# Patient Record
Sex: FEMALE | Race: WHITE | Employment: PART TIME | URBAN - METROPOLITAN AREA
[De-identification: names, ages, dates, MRNs, and addresses within clinical notes are randomized per-mention and may not be internally consistent; named-entity substitution may affect disease eponyms.]

---

## 2023-09-06 ENCOUNTER — TELEPHONE (OUTPATIENT)
Dept: FAMILY MEDICINE CLINIC | Age: 58
End: 2023-09-06

## 2023-09-06 DIAGNOSIS — E78.2 MIXED HYPERLIPIDEMIA: ICD-10-CM

## 2023-09-06 DIAGNOSIS — E11.49 TYPE 2 DIABETES MELLITUS WITH NEUROLOGICAL MANIFESTATIONS, CONTROLLED (HCC): Primary | ICD-10-CM

## 2023-09-06 DIAGNOSIS — F33.1 MODERATE RECURRENT MAJOR DEPRESSION (HCC): ICD-10-CM

## 2023-09-06 DIAGNOSIS — E03.8 OTHER SPECIFIED HYPOTHYROIDISM: ICD-10-CM

## 2023-09-06 NOTE — TELEPHONE ENCOUNTER
Patient calling an stating that she was due for lab work in August,  patient was unable to do and is in need of new orders and she would like A1C and whatever other lab work he feels is necessary at this time for her    Patient would like the orders faxed to :  [facility]  #[phone] (fax)

## 2023-09-07 RX ORDER — ARIPIPRAZOLE 20 MG/1
TABLET ORAL
COMMUNITY
Start: 2023-08-21

## 2023-09-07 RX ORDER — BENZTROPINE MESYLATE 0.5 MG/1
TABLET ORAL
COMMUNITY
Start: 2023-08-21

## 2023-09-07 RX ORDER — LEVOTHYROXINE SODIUM 0.03 MG/1
TABLET ORAL
COMMUNITY
Start: 2023-07-31

## 2023-09-07 RX ORDER — HALOPERIDOL 5 MG/1
TABLET ORAL
COMMUNITY
Start: 2023-08-03

## 2023-09-07 RX ORDER — LURASIDONE HYDROCHLORIDE 40 MG/1
TABLET, FILM COATED ORAL
COMMUNITY
Start: 2023-08-04

## 2023-09-07 RX ORDER — VENLAFAXINE 75 MG/1
TABLET ORAL
COMMUNITY
Start: 2023-08-03

## 2023-09-07 NOTE — TELEPHONE ENCOUNTER
Faxed lab work to [facility] and called to inform patient that it was done

## 2023-09-17 SDOH — ECONOMIC STABILITY: HOUSING INSECURITY
IN THE LAST 12 MONTHS, WAS THERE A TIME WHEN YOU DID NOT HAVE A STEADY PLACE TO SLEEP OR SLEPT IN A SHELTER (INCLUDING NOW)?: NO

## 2023-09-17 SDOH — ECONOMIC STABILITY: FOOD INSECURITY: WITHIN THE PAST 12 MONTHS, THE FOOD YOU BOUGHT JUST DIDN'T LAST AND YOU DIDN'T HAVE MONEY TO GET MORE.: NEVER TRUE

## 2023-09-17 SDOH — ECONOMIC STABILITY: FOOD INSECURITY: WITHIN THE PAST 12 MONTHS, YOU WORRIED THAT YOUR FOOD WOULD RUN OUT BEFORE YOU GOT MONEY TO BUY MORE.: NEVER TRUE

## 2023-09-17 SDOH — ECONOMIC STABILITY: INCOME INSECURITY: HOW HARD IS IT FOR YOU TO PAY FOR THE VERY BASICS LIKE FOOD, HOUSING, MEDICAL CARE, AND HEATING?: NOT VERY HARD

## 2023-09-17 SDOH — ECONOMIC STABILITY: TRANSPORTATION INSECURITY
IN THE PAST 12 MONTHS, HAS LACK OF TRANSPORTATION KEPT YOU FROM MEETINGS, WORK, OR FROM GETTING THINGS NEEDED FOR DAILY LIVING?: NO

## 2023-09-17 ASSESSMENT — PATIENT HEALTH QUESTIONNAIRE - PHQ9
2. FEELING DOWN, DEPRESSED OR HOPELESS: 1
SUM OF ALL RESPONSES TO PHQ QUESTIONS 1-9: 1
SUM OF ALL RESPONSES TO PHQ9 QUESTIONS 1 & 2: 1
2. FEELING DOWN, DEPRESSED OR HOPELESS: SEVERAL DAYS
1. LITTLE INTEREST OR PLEASURE IN DOING THINGS: NOT AT ALL
SUM OF ALL RESPONSES TO PHQ QUESTIONS 1-9: 1
SUM OF ALL RESPONSES TO PHQ QUESTIONS 1-9: 1
SUM OF ALL RESPONSES TO PHQ9 QUESTIONS 1 & 2: 1
1. LITTLE INTEREST OR PLEASURE IN DOING THINGS: 0
SUM OF ALL RESPONSES TO PHQ QUESTIONS 1-9: 1

## 2023-09-18 ENCOUNTER — OFFICE VISIT (OUTPATIENT)
Dept: FAMILY MEDICINE CLINIC | Age: 58
End: 2023-09-18
Payer: MEDICARE

## 2023-09-18 VITALS
OXYGEN SATURATION: 99 % | BODY MASS INDEX: 30.47 KG/M2 | DIASTOLIC BLOOD PRESSURE: 86 MMHG | SYSTOLIC BLOOD PRESSURE: 132 MMHG | WEIGHT: 189.6 LBS | HEART RATE: 74 BPM | TEMPERATURE: 98.2 F | HEIGHT: 66 IN

## 2023-09-18 DIAGNOSIS — R25.9 ABNORMAL MOVEMENTS: ICD-10-CM

## 2023-09-18 DIAGNOSIS — F33.42 MAJOR DEPRESSIVE DISORDER, RECURRENT, IN FULL REMISSION WITH MOOD-CONGRUENT PSYCHOTIC FEATURES (HCC): ICD-10-CM

## 2023-09-18 DIAGNOSIS — I10 PRIMARY HYPERTENSION: ICD-10-CM

## 2023-09-18 DIAGNOSIS — E11.69 TYPE 2 DIABETES MELLITUS WITH HYPERLIPIDEMIA (HCC): Primary | ICD-10-CM

## 2023-09-18 DIAGNOSIS — E07.9 DISORDER OF THYROID: ICD-10-CM

## 2023-09-18 DIAGNOSIS — E78.5 TYPE 2 DIABETES MELLITUS WITH HYPERLIPIDEMIA (HCC): Primary | ICD-10-CM

## 2023-09-18 PROCEDURE — 3075F SYST BP GE 130 - 139MM HG: CPT | Performed by: FAMILY MEDICINE

## 2023-09-18 PROCEDURE — 3079F DIAST BP 80-89 MM HG: CPT | Performed by: FAMILY MEDICINE

## 2023-09-18 PROCEDURE — 99214 OFFICE O/P EST MOD 30 MIN: CPT | Performed by: FAMILY MEDICINE

## 2023-09-18 RX ORDER — LIOTHYRONINE SODIUM 5 UG/1
TABLET ORAL
COMMUNITY
Start: 2022-09-14 | End: 2023-09-18 | Stop reason: SDUPTHER

## 2023-09-18 RX ORDER — LEVOTHYROXINE SODIUM 0.03 MG/1
25 TABLET ORAL DAILY
Qty: 90 TABLET | Refills: 1 | Status: SHIPPED | OUTPATIENT
Start: 2023-09-18

## 2023-09-18 RX ORDER — METOPROLOL SUCCINATE 25 MG/1
25 TABLET, EXTENDED RELEASE ORAL
COMMUNITY
Start: 2022-09-15 | End: 2023-09-18 | Stop reason: SDUPTHER

## 2023-09-18 RX ORDER — METOPROLOL SUCCINATE 25 MG/1
25 TABLET, EXTENDED RELEASE ORAL DAILY
Qty: 90 TABLET | Refills: 1 | Status: SHIPPED | OUTPATIENT
Start: 2023-09-18

## 2023-09-18 RX ORDER — LIOTHYRONINE SODIUM 5 UG/1
5 TABLET ORAL DAILY
Qty: 90 TABLET | Refills: 1 | Status: SHIPPED | OUTPATIENT
Start: 2023-09-18

## 2023-09-18 RX ORDER — ARIPIPRAZOLE 20 MG/1
20 TABLET ORAL DAILY
Qty: 90 TABLET | Refills: 1 | Status: SHIPPED | OUTPATIENT
Start: 2023-09-18

## 2023-09-18 NOTE — PROGRESS NOTES
Subjective  Bonnie Devi 1965 is a 60 y.o. female who presents today with:  Chief Complaint   Patient presents with    Thyroid Problem     Hx hypothyroidism. She is currently taking Cytomel 5 mcg and Synthroid 25 mcg daily. She has recent labs to review from 9/7/23. Hypertension     She does not monitor her BP at home. Denies chest pains, palpitations, SOB, dizziness, lightheadedness, headaches or edema. Depression     She follows with Ruthann Lopez NP for [facility] concerns. She is scheduled for her next appointment on Monday. Health Maintenance     She has recent labs to review from 9/7/23 to review. Mammogram and DEXA completed at [facility] on 9/6/23. Colonoscopy completed last year at [facility]. AWV completed in August 2023 at [facility]. Other     Reports she has noticed that she has been getting a quivering in her bottom lip abut 2-3 times daily. States she can feel this at times, and not at others. I have reviewed HPI and agree with above. Lip quivering only for about 3-4 weeks. Concerned about TD. Labs done. No results. DEXA and Mammogram done as well    Review of Systems   All other systems reviewed and are negative.       Past Medical History:   Diagnosis Date    Depression     Hypertension     Hypothyroidism      Past Surgical History:   Procedure Laterality Date    HYSTERECTOMY, VAGINAL  2005     Social History     Socioeconomic History    Marital status:      Spouse name: Not on file    Number of children: Not on file    Years of education: Not on file    Highest education level: Not on file   Occupational History    Not on file   Tobacco Use    Smoking status: Never    Smokeless tobacco: Never   Vaping Use    Vaping Use: Never used   Substance and Sexual Activity    Alcohol use: Not Currently     Comment: Not very often    Drug use: Not Currently     Comment: In late teens and twenties    Sexual activity: Not Currently     Comment: Not active - no partner at this time   Other

## 2023-10-18 ENCOUNTER — OFFICE VISIT (OUTPATIENT)
Dept: FAMILY MEDICINE CLINIC | Age: 58
End: 2023-10-18
Payer: MEDICARE

## 2023-10-18 VITALS
TEMPERATURE: 97.6 F | DIASTOLIC BLOOD PRESSURE: 68 MMHG | BODY MASS INDEX: 30.67 KG/M2 | OXYGEN SATURATION: 95 % | WEIGHT: 190.8 LBS | HEART RATE: 68 BPM | SYSTOLIC BLOOD PRESSURE: 116 MMHG | HEIGHT: 66 IN

## 2023-10-18 DIAGNOSIS — M99.04 SOMATIC DYSFUNCTION OF BOTH SACROILIAC JOINTS: ICD-10-CM

## 2023-10-18 DIAGNOSIS — Z23 NEED FOR INFLUENZA VACCINATION: ICD-10-CM

## 2023-10-18 DIAGNOSIS — M25.551 RIGHT HIP PAIN: Primary | ICD-10-CM

## 2023-10-18 PROCEDURE — 99213 OFFICE O/P EST LOW 20 MIN: CPT | Performed by: FAMILY MEDICINE

## 2023-10-18 PROCEDURE — 3074F SYST BP LT 130 MM HG: CPT | Performed by: FAMILY MEDICINE

## 2023-10-18 PROCEDURE — G0008 ADMIN INFLUENZA VIRUS VAC: HCPCS | Performed by: FAMILY MEDICINE

## 2023-10-18 PROCEDURE — 90674 CCIIV4 VAC NO PRSV 0.5 ML IM: CPT | Performed by: FAMILY MEDICINE

## 2023-10-18 PROCEDURE — 3078F DIAST BP <80 MM HG: CPT | Performed by: FAMILY MEDICINE

## 2023-10-18 NOTE — PROGRESS NOTES
Kellie DC. Inappropriate Request    Refill Authorization Note   Sherin Ortiz  is requesting a refill authorization.  Brief Assessment and Rationale for Refill:  Quick Discontinue  Medication Therapy Plan:    Pharmacy is requesting new scripts for the following medications without required information, (sig/ frequency/qty/etc)       Medication Reconciliation Completed:  No      Comments: Pharmacies have been requesting medications for patients without required information, (sig, frequency, qty, etc.). In addition, requests are sent for medication(s) pt. are currently not taking, and medications patients have never taken.    We have spoken to the pharmacies about these request types and advised their teams previously that we are unable to assess these New Script requests and require all details for these requests. This is a known issue and has been reported.        Note composed:8:30 PM 04/26/2022                
Vaccine Information Sheet, \"Influenza - Inactivated\"  given to Deandre Hsu, or parent/legal guardian of  Deandre Hsu and verbalized understanding. Patient responses:    Have you ever had a reaction to a flu vaccine? No  Are you able to eat eggs without adverse effects? Yes  Do you have any current illness? No  Have you ever had Guillian Earp Syndrome? No    Flu vaccine given per order. Please see immunization tab.
10/18/23 86.5 kg (190 lb 12.8 oz)   09/18/23 86 kg (189 lb 9.6 oz)       No results found for: \"LABA1C\"  No results found for: \"CREATININE\"  No results found for: \"ALT\", \"AST\"  No results found for: \"CHOL\", \"TRIG\", \"HDL\", \"LDLCALC\", \"LDLDIRECT\"       Physical Exam  Vitals reviewed. HENT:      Head: Normocephalic. Cardiovascular:      Comments: There is bilateral first rib elevation. Corrected with FPR. Musculoskeletal:      Cervical back: Rigidity and tenderness (Due to a flexed dysfunction at several segments of the right side of the cervical spine. Corrected with very gentle FPR and muscle energy. ) present. Thoracic back: Spasms and tenderness present. Lacerations: Due to a mild scoliosis and kyphosis. Multiple segments of flexed dysfunction that are chronic. Gentle FPR used for treatment. .Decreased range of motion. Lumbar back: Tenderness (At bilateral sacroiliac joints. Left SI joint is extended right SI joint is flexed. Corrected with FPR muscle energy. ) present. Decreased range of motion. Neurological:      Mental Status: She is alert. Assessment & Plan   1. Right hip pain  2. Somatic dysfunction of both sacroiliac joints  3. Need for influenza vaccination  -     Influenza, FLUCELVAX, (age 10 mo+), IM, Preservative Free, 0.5 mL     Orders Placed This Encounter   Procedures    Influenza, FLUCELVAX, (age 10 mo+), IM, Preservative Free, 0.5 mL     No orders of the defined types were placed in this encounter. There are no discontinued medications. No follow-ups on file. Reviewed with the patient: current clinical status, medications, activities and diet. Side effects, adverse effects of the medication prescribed today, as well as treatment plan/ rationale and result expectations have been discussed with the patient who expresses understanding and desires to proceed. Close follow up to evaluate treatment results and for coordination of care.   I have reviewed the

## 2023-11-16 RX ORDER — LIOTHYRONINE SODIUM 5 UG/1
2.5 TABLET ORAL DAILY
Qty: 45 TABLET | Refills: 3 | Status: SHIPPED | OUTPATIENT
Start: 2023-11-16

## 2023-11-16 RX ORDER — ARIPIPRAZOLE 20 MG/1
20 TABLET ORAL 2 TIMES DAILY
Qty: 180 TABLET | Refills: 3 | Status: SHIPPED | OUTPATIENT
Start: 2023-11-16

## 2023-11-16 NOTE — TELEPHONE ENCOUNTER
Patient calling in says 2 of her Rxs her prescribed wrong.  Needs corrected    Abilifify 20mg- 1 tab 1x daily IS SUPPOSED to be 2x DAILY    Liothyronine 5mcg- take 1 tab a day IS SUPPOSED TO BE TAKE HALF A TAB A DAILY    Patients preferred Pharmacy for ongoing RXs- also needs to 90x days supply-[facility]-[phone]    Please call Chriss Hernandez if any questions- [phone]

## 2023-11-22 ENCOUNTER — OFFICE VISIT (OUTPATIENT)
Dept: FAMILY MEDICINE CLINIC | Age: 58
End: 2023-11-22
Payer: MEDICARE

## 2023-11-22 VITALS
OXYGEN SATURATION: 97 % | HEIGHT: 66 IN | SYSTOLIC BLOOD PRESSURE: 112 MMHG | HEART RATE: 75 BPM | DIASTOLIC BLOOD PRESSURE: 70 MMHG | WEIGHT: 190.6 LBS | TEMPERATURE: 97.9 F | BODY MASS INDEX: 30.63 KG/M2

## 2023-11-22 DIAGNOSIS — G56.02 CARPAL TUNNEL SYNDROME OF LEFT WRIST: ICD-10-CM

## 2023-11-22 DIAGNOSIS — M75.42 CORACOID IMPINGEMENT OF LEFT SHOULDER: ICD-10-CM

## 2023-11-22 DIAGNOSIS — M75.42 ROTATOR CUFF IMPINGEMENT SYNDROME OF LEFT SHOULDER: Primary | ICD-10-CM

## 2023-11-22 PROCEDURE — 3078F DIAST BP <80 MM HG: CPT | Performed by: FAMILY MEDICINE

## 2023-11-22 PROCEDURE — 3074F SYST BP LT 130 MM HG: CPT | Performed by: FAMILY MEDICINE

## 2023-11-22 PROCEDURE — 99213 OFFICE O/P EST LOW 20 MIN: CPT | Performed by: FAMILY MEDICINE

## 2023-11-22 RX ORDER — METHYLPREDNISOLONE 4 MG/1
TABLET ORAL
Qty: 1 KIT | Refills: 0 | Status: SHIPPED | OUTPATIENT
Start: 2023-11-22

## 2023-11-22 NOTE — PROGRESS NOTES
Subjective  Ana Paula Gabriel 1965 is a 60 y.o. female who presents today with:  Chief Complaint   Patient presents with    Shoulder Pain     C/O left shoulder pain x 4 weeks. Denies injury. She was out raking leaves about 2 weeks ago and the pain has been almost constant ever since. She does get some tingling and numbness in her arm/hand. Hx carpal tunnel. She cannot raise her arm above her head. Denies headaches, dizziness, or neck pain. She has not been taking anything for relief. I have reviewed HPI and agree with above. Review of Systems   All other systems reviewed and are negative.       Past Medical History:   Diagnosis Date    Depression     Hypertension     Hypothyroidism      Past Surgical History:   Procedure Laterality Date    HYSTERECTOMY, VAGINAL  2005     Social History     Socioeconomic History    Marital status:      Spouse name: Not on file    Number of children: Not on file    Years of education: Not on file    Highest education level: Not on file   Occupational History    Not on file   Tobacco Use    Smoking status: Never    Smokeless tobacco: Never   Vaping Use    Vaping Use: Never used   Substance and Sexual Activity    Alcohol use: Not Currently     Comment: Not very often    Drug use: Not Currently     Comment: In late teens and twenties    Sexual activity: Not Currently     Comment: Not active - no partner at this time   Other Topics Concern    Not on file   Social History Narrative    Not on file     Social Determinants of Health     Financial Resource Strain: Low Risk  (9/17/2023)    Overall Financial Resource Strain (CARDIA)     Difficulty of Paying Living Expenses: Not very hard   Food Insecurity: No Food Insecurity (9/17/2023)    Hunger Vital Sign     Worried About Running Out of Food in the Last Year: Never true     [address] in the Last Year: Never true   Transportation Needs: Unknown (9/17/2023)    PRAPARE - Transportation     Lack of Transportation

## 2023-12-11 ENCOUNTER — TELEPHONE (OUTPATIENT)
Dept: FAMILY MEDICINE CLINIC | Age: 58
End: 2023-12-11

## 2023-12-11 NOTE — TELEPHONE ENCOUNTER
Patient is inquiring if her pcp has any availability this Wednesday. She is still having left shoulder pain that was discussed at her 11/22 appt and would like to f/u.

## 2023-12-13 ENCOUNTER — OFFICE VISIT (OUTPATIENT)
Dept: FAMILY MEDICINE CLINIC | Age: 58
End: 2023-12-13
Payer: MEDICARE

## 2023-12-13 VITALS
BODY MASS INDEX: 30.53 KG/M2 | SYSTOLIC BLOOD PRESSURE: 130 MMHG | DIASTOLIC BLOOD PRESSURE: 78 MMHG | HEIGHT: 66 IN | OXYGEN SATURATION: 97 % | WEIGHT: 190 LBS | HEART RATE: 85 BPM | TEMPERATURE: 97.9 F

## 2023-12-13 DIAGNOSIS — M75.42 ROTATOR CUFF IMPINGEMENT SYNDROME OF LEFT SHOULDER: Primary | ICD-10-CM

## 2023-12-13 PROCEDURE — 3075F SYST BP GE 130 - 139MM HG: CPT | Performed by: FAMILY MEDICINE

## 2023-12-13 PROCEDURE — 3078F DIAST BP <80 MM HG: CPT | Performed by: FAMILY MEDICINE

## 2023-12-13 PROCEDURE — 99213 OFFICE O/P EST LOW 20 MIN: CPT | Performed by: FAMILY MEDICINE

## 2023-12-13 NOTE — PROGRESS NOTES
Lucinda Perez 1965 is a 60 y.o. female who presents today with:  Chief Complaint   Patient presents with    Shoulder Pain     C/O worsening left shoulder pain. She was given a medrol dose ricardo at her last visit, but reports the pain is worse today than it was a few weeks ago. She does get some tingling and numbness in her arm/hand. She cannot raise her arm above her head. Denies headaches, dizziness, or neck pain. She has not been taking anything for relief. Shoulder Pain       I have reviewed HPI and agree with above. Anything above shoulder causes pain. No numbness or tingling. Weakness due to pain. Was doing the home therapy. Not sure it changed anything. Rest and ice    Review of Systems   All other systems reviewed and are negative.       Past Medical History:   Diagnosis Date    Depression     Hypertension     Hypothyroidism      Past Surgical History:   Procedure Laterality Date    HYSTERECTOMY, VAGINAL  2005     Social History     Socioeconomic History    Marital status:      Spouse name: Not on file    Number of children: Not on file    Years of education: Not on file    Highest education level: Not on file   Occupational History    Not on file   Tobacco Use    Smoking status: Never    Smokeless tobacco: Never   Vaping Use    Vaping Use: Never used   Substance and Sexual Activity    Alcohol use: Not Currently     Comment: Not very often    Drug use: Not Currently     Comment: In late teens and twenties    Sexual activity: Not Currently     Comment: Not active - no partner at this time   Other Topics Concern    Not on file   Social History Narrative    Not on file     Social Determinants of Health     Financial Resource Strain: Low Risk  (9/17/2023)    Overall Financial Resource Strain (CARDIA)     Difficulty of Paying Living Expenses: Not very hard   Food Insecurity: Not on file (9/17/2023)   Transportation Needs: Unknown (9/17/2023)    [facility] - Transportation     Lack

## 2024-01-03 ENCOUNTER — OFFICE VISIT (OUTPATIENT)
Dept: FAMILY MEDICINE CLINIC | Age: 59
End: 2024-01-03
Payer: MEDICARE

## 2024-01-03 VITALS
BODY MASS INDEX: 30.53 KG/M2 | OXYGEN SATURATION: 97 % | SYSTOLIC BLOOD PRESSURE: 118 MMHG | TEMPERATURE: 97.9 F | HEART RATE: 71 BPM | DIASTOLIC BLOOD PRESSURE: 70 MMHG | HEIGHT: 66 IN | WEIGHT: 190 LBS

## 2024-01-03 DIAGNOSIS — M75.42 ROTATOR CUFF IMPINGEMENT SYNDROME OF LEFT SHOULDER: Primary | ICD-10-CM

## 2024-01-03 PROCEDURE — 3074F SYST BP LT 130 MM HG: CPT | Performed by: FAMILY MEDICINE

## 2024-01-03 PROCEDURE — 20610 DRAIN/INJ JOINT/BURSA W/O US: CPT | Performed by: FAMILY MEDICINE

## 2024-01-03 PROCEDURE — 3078F DIAST BP <80 MM HG: CPT | Performed by: FAMILY MEDICINE

## 2024-01-03 PROCEDURE — 99212 OFFICE O/P EST SF 10 MIN: CPT | Performed by: FAMILY MEDICINE

## 2024-01-03 RX ORDER — TRIAMCINOLONE ACETONIDE 40 MG/ML
40 INJECTION, SUSPENSION INTRA-ARTICULAR; INTRAMUSCULAR ONCE
Status: COMPLETED | OUTPATIENT
Start: 2024-01-03 | End: 2024-01-03

## 2024-01-03 RX ADMIN — TRIAMCINOLONE ACETONIDE 40 MG: 40 INJECTION, SUSPENSION INTRA-ARTICULAR; INTRAMUSCULAR at 12:10

## 2024-01-03 NOTE — PROGRESS NOTES
Lucinda Lozoya 1965 is a 58 y.o. female who presents today with:  No chief complaint on file.      HPI  I have reviewed HPI and agree with above. Left shoulder impingement.  Seems to be improving.  Would like injection before vacation.    Review of Systems   All other systems reviewed and are negative.      Past Medical History:   Diagnosis Date    Depression     Hypertension     Hypothyroidism      Past Surgical History:   Procedure Laterality Date    HYSTERECTOMY, VAGINAL  2005     Social History     Socioeconomic History    Marital status:      Spouse name: Not on file    Number of children: Not on file    Years of education: Not on file    Highest education level: Not on file   Occupational History    Not on file   Tobacco Use    Smoking status: Never    Smokeless tobacco: Never   Vaping Use    Vaping Use: Never used   Substance and Sexual Activity    Alcohol use: Not Currently     Comment: Not very often    Drug use: Not Currently     Comment: In late teens and twenties    Sexual activity: Not Currently     Comment: Not active - no partner at this time   Other Topics Concern    Not on file   Social History Narrative    Not on file     Social Determinants of Health     Financial Resource Strain: Low Risk  (9/17/2023)    Overall Financial Resource Strain (CARDIA)     Difficulty of Paying Living Expenses: Not very hard   Food Insecurity: Not on file (9/17/2023)   Transportation Needs: Unknown (9/17/2023)    PRAPARE - Transportation     Lack of Transportation (Medical): Not on file     Lack of Transportation (Non-Medical): No   Physical Activity: Not on file   Stress: Not on file   Social Connections: Not on file   Intimate Partner Violence: Not on file   Housing Stability: Unknown (9/17/2023)    Housing Stability Vital Sign     Unable to Pay for Housing in the Last Year: Not on file     Number of Places Lived in the Last Year: Not on file     Unstable Housing in the Last Year: No

## 2024-01-24 RX ORDER — POLYETHYLENE GLYCOL 3350 17 G/17G
17 POWDER, FOR SOLUTION ORAL DAILY
Qty: 1530 G | Refills: 3 | Status: SHIPPED | OUTPATIENT
Start: 2024-01-24 | End: 2025-01-18

## 2024-01-24 NOTE — TELEPHONE ENCOUNTER
Patient is requesting a refill of Gavilax powder. The directions are to mix and drink 17grams daily.    [facility] in [place] is the preferred pharmacy.

## 2024-02-27 RX ORDER — METOPROLOL SUCCINATE 25 MG
25 TABLET, EXTENDED RELEASE 24 HR ORAL DAILY
Qty: 90 TABLET | Refills: 1 | Status: SHIPPED | OUTPATIENT
Start: 2024-02-27

## 2024-03-11 LAB
ESTIMATED AVERAGE GLUCOSE: NORMAL
GFR, ESTIMATED: 73
HBA1C MFR BLD: 6.2 %

## 2024-03-14 ASSESSMENT — PATIENT HEALTH QUESTIONNAIRE - PHQ9
9. THOUGHTS THAT YOU WOULD BE BETTER OFF DEAD, OR OF HURTING YOURSELF: 0
5. POOR APPETITE OR OVEREATING: 1
6. FEELING BAD ABOUT YOURSELF - OR THAT YOU ARE A FAILURE OR HAVE LET YOURSELF OR YOUR FAMILY DOWN: SEVERAL DAYS
10. IF YOU CHECKED OFF ANY PROBLEMS, HOW DIFFICULT HAVE THESE PROBLEMS MADE IT FOR YOU TO DO YOUR WORK, TAKE CARE OF THINGS AT HOME, OR GET ALONG WITH OTHER PEOPLE: 1
1. LITTLE INTEREST OR PLEASURE IN DOING THINGS: SEVERAL DAYS
SUM OF ALL RESPONSES TO PHQ QUESTIONS 1-9: 4
4. FEELING TIRED OR HAVING LITTLE ENERGY: NOT AT ALL
2. FEELING DOWN, DEPRESSED OR HOPELESS: SEVERAL DAYS
SUM OF ALL RESPONSES TO PHQ QUESTIONS 1-9: 4
2. FEELING DOWN, DEPRESSED OR HOPELESS: 1
SUM OF ALL RESPONSES TO PHQ QUESTIONS 1-9: 4
4. FEELING TIRED OR HAVING LITTLE ENERGY: 0
10. IF YOU CHECKED OFF ANY PROBLEMS, HOW DIFFICULT HAVE THESE PROBLEMS MADE IT FOR YOU TO DO YOUR WORK, TAKE CARE OF THINGS AT HOME, OR GET ALONG WITH OTHER PEOPLE: SOMEWHAT DIFFICULT
9. THOUGHTS THAT YOU WOULD BE BETTER OFF DEAD, OR OF HURTING YOURSELF: NOT AT ALL
3. TROUBLE FALLING OR STAYING ASLEEP: NOT AT ALL
7. TROUBLE CONCENTRATING ON THINGS, SUCH AS READING THE NEWSPAPER OR WATCHING TELEVISION: NOT AT ALL
3. TROUBLE FALLING OR STAYING ASLEEP: 0
SUM OF ALL RESPONSES TO PHQ QUESTIONS 1-9: 4
SUM OF ALL RESPONSES TO PHQ9 QUESTIONS 1 & 2: 2
8. MOVING OR SPEAKING SO SLOWLY THAT OTHER PEOPLE COULD HAVE NOTICED. OR THE OPPOSITE - BEING SO FIDGETY OR RESTLESS THAT YOU HAVE BEEN MOVING AROUND A LOT MORE THAN USUAL: NOT AT ALL
8. MOVING OR SPEAKING SO SLOWLY THAT OTHER PEOPLE COULD HAVE NOTICED. OR THE OPPOSITE, BEING SO FIGETY OR RESTLESS THAT YOU HAVE BEEN MOVING AROUND A LOT MORE THAN USUAL: 0
1. LITTLE INTEREST OR PLEASURE IN DOING THINGS: 1
5. POOR APPETITE OR OVEREATING: SEVERAL DAYS
7. TROUBLE CONCENTRATING ON THINGS, SUCH AS READING THE NEWSPAPER OR WATCHING TELEVISION: 0
SUM OF ALL RESPONSES TO PHQ QUESTIONS 1-9: 4
6. FEELING BAD ABOUT YOURSELF - OR THAT YOU ARE A FAILURE OR HAVE LET YOURSELF OR YOUR FAMILY DOWN: 1

## 2024-03-15 ENCOUNTER — OFFICE VISIT (OUTPATIENT)
Dept: FAMILY MEDICINE CLINIC | Age: 59
End: 2024-03-15
Payer: MEDICARE

## 2024-03-15 VITALS
HEIGHT: 66 IN | DIASTOLIC BLOOD PRESSURE: 74 MMHG | TEMPERATURE: 97.4 F | WEIGHT: 190 LBS | OXYGEN SATURATION: 97 % | BODY MASS INDEX: 30.53 KG/M2 | HEART RATE: 67 BPM | SYSTOLIC BLOOD PRESSURE: 130 MMHG

## 2024-03-15 VITALS
OXYGEN SATURATION: 97 % | WEIGHT: 190 LBS | HEIGHT: 66 IN | TEMPERATURE: 97.4 F | HEART RATE: 67 BPM | DIASTOLIC BLOOD PRESSURE: 74 MMHG | SYSTOLIC BLOOD PRESSURE: 130 MMHG | BODY MASS INDEX: 30.53 KG/M2

## 2024-03-15 DIAGNOSIS — Z00.00 INITIAL MEDICARE ANNUAL WELLNESS VISIT: Primary | ICD-10-CM

## 2024-03-15 DIAGNOSIS — F33.42 MAJOR DEPRESSIVE DISORDER, RECURRENT, IN FULL REMISSION WITH MOOD-CONGRUENT PSYCHOTIC FEATURES (HCC): ICD-10-CM

## 2024-03-15 DIAGNOSIS — E78.5 TYPE 2 DIABETES MELLITUS WITH HYPERLIPIDEMIA (HCC): Primary | ICD-10-CM

## 2024-03-15 DIAGNOSIS — E11.69 TYPE 2 DIABETES MELLITUS WITH HYPERLIPIDEMIA (HCC): Primary | ICD-10-CM

## 2024-03-15 DIAGNOSIS — E03.8 OTHER SPECIFIED HYPOTHYROIDISM: ICD-10-CM

## 2024-03-15 PROCEDURE — 3078F DIAST BP <80 MM HG: CPT | Performed by: FAMILY MEDICINE

## 2024-03-15 PROCEDURE — 3075F SYST BP GE 130 - 139MM HG: CPT | Performed by: FAMILY MEDICINE

## 2024-03-15 PROCEDURE — G0438 PPPS, INITIAL VISIT: HCPCS | Performed by: FAMILY MEDICINE

## 2024-03-15 PROCEDURE — 99214 OFFICE O/P EST MOD 30 MIN: CPT | Performed by: FAMILY MEDICINE

## 2024-03-15 RX ORDER — GLUCOSAMINE HCL/CHONDROITIN SU 500-400 MG
CAPSULE ORAL
Qty: 30 STRIP | Refills: 3 | Status: SHIPPED | OUTPATIENT
Start: 2024-03-15

## 2024-03-15 ASSESSMENT — PATIENT HEALTH QUESTIONNAIRE - PHQ9
SUM OF ALL RESPONSES TO PHQ QUESTIONS 1-9: 4
SUM OF ALL RESPONSES TO PHQ QUESTIONS 1-9: 4
2. FEELING DOWN, DEPRESSED OR HOPELESS: SEVERAL DAYS
4. FEELING TIRED OR HAVING LITTLE ENERGY: NOT AT ALL
1. LITTLE INTEREST OR PLEASURE IN DOING THINGS: SEVERAL DAYS
7. TROUBLE CONCENTRATING ON THINGS, SUCH AS READING THE NEWSPAPER OR WATCHING TELEVISION: NOT AT ALL
SUM OF ALL RESPONSES TO PHQ QUESTIONS 1-9: 4
5. POOR APPETITE OR OVEREATING: SEVERAL DAYS
SUM OF ALL RESPONSES TO PHQ9 QUESTIONS 1 & 2: 2
SUM OF ALL RESPONSES TO PHQ QUESTIONS 1-9: 4
6. FEELING BAD ABOUT YOURSELF - OR THAT YOU ARE A FAILURE OR HAVE LET YOURSELF OR YOUR FAMILY DOWN: SEVERAL DAYS
10. IF YOU CHECKED OFF ANY PROBLEMS, HOW DIFFICULT HAVE THESE PROBLEMS MADE IT FOR YOU TO DO YOUR WORK, TAKE CARE OF THINGS AT HOME, OR GET ALONG WITH OTHER PEOPLE: SOMEWHAT DIFFICULT
3. TROUBLE FALLING OR STAYING ASLEEP: NOT AT ALL
8. MOVING OR SPEAKING SO SLOWLY THAT OTHER PEOPLE COULD HAVE NOTICED. OR THE OPPOSITE, BEING SO FIGETY OR RESTLESS THAT YOU HAVE BEEN MOVING AROUND A LOT MORE THAN USUAL: NOT AT ALL

## 2024-03-15 ASSESSMENT — LIFESTYLE VARIABLES
HOW OFTEN DO YOU HAVE A DRINK CONTAINING ALCOHOL: MONTHLY OR LESS
HOW MANY STANDARD DRINKS CONTAINING ALCOHOL DO YOU HAVE ON A TYPICAL DAY: 1 OR 2

## 2024-03-15 NOTE — PROGRESS NOTES
Subjective  Marsha Lozoya 1965 is a 58 y.o. female who presents today with:  Chief Complaint   Patient presents with    Hypertension     She does not monitor her BP at home. Denies chest pains, palpitations, SOB, dizziness, lightheadedness, headaches or edema.     Thyroid Problem     Hx hypothyroidism. She is currently taking Cytomel 5 mcg and Synthroid 25 mcg daily.     Depression     Moods stable with current medication. She follows with Steffany Pelayo NP for  concerns.    Health Maintenance     She has recent labs to review from 3/11/24 to review.   Mammogram and DEXA completed at [facility] on 9/6/23.   Colonoscopy completed last in April 2022 at [facility].  AWV completed last on 06/19/23.    Diabetes     A1C was 6.2 on 03/11/24.  Due for urine microalbumin.   Diet controlled.       Hypertension  Identifiable causes of hypertension include a thyroid problem.   Thyroid Problem    Depression  Diabetes      I have reviewed HPI and agree with above.     Review of Systems   Psychiatric/Behavioral:  Positive for depression.    All other systems reviewed and are negative.      Past Medical History:   Diagnosis Date    Depression     Hypertension     Hypothyroidism      Past Surgical History:   Procedure Laterality Date    HYSTERECTOMY, VAGINAL  2005     Social History     Socioeconomic History    Marital status:      Spouse name: Not on file    Number of children: Not on file    Years of education: Not on file    Highest education level: Not on file   Occupational History    Not on file   Tobacco Use    Smoking status: Never    Smokeless tobacco: Never   Vaping Use    Vaping Use: Never used   Substance and Sexual Activity    Alcohol use: Not Currently     Comment: Not very often    Drug use: Not Currently     Comment: In late teens and twenties    Sexual activity: Not Currently     Comment: Not active - no partner at this time   Other Topics Concern    Not on file   Social History Narrative    Not on file

## 2024-03-18 NOTE — PROGRESS NOTES
°C)   TempSrc: Temporal   SpO2: 97%   Weight: 86.2 kg (190 lb)   Height: 1.676 m (5' 6\")      Body mass index is 30.67 kg/m².        See OV       Allergies   Allergen Reactions    Ciprofloxacin Hallucinations     Other reaction(s): Mood alteration, Unknown    Amoxicillin-Pot Clavulanate      (Augmentin Allergy) Reaction: mouth ulceration    Codeine      Other reaction(s): Mood alteration     Prior to Visit Medications    Medication Sig Taking? Authorizing Provider   Calcium Carbonate-Vit D-Min (QC CALCIUM-MAGNESIUM-ZINC-D3 PO) Take by mouth  ProviderEdin MD   blood glucose monitor strips Test once times a day & as needed for symptoms of irregular blood glucose. Dispense sufficient amount for indicated testing frequency plus additional to accommodate PRN testing needs.  Mariano Mistry DO   TOPROL XL 25 MG extended release tablet TAKE 1 TABLET BY MOUTH ONCE A DAY  Mariano Mistry DO   polyethylene glycol (GLYCOLAX) 17 GM/SCOOP powder Take 17 g by mouth daily  Mariano Mistry DO   ARIPiprazole (ABILIFY) 20 MG tablet Take 1 tablet by mouth in the morning and at bedtime  Mariano Mistry DO   liothyronine (CYTOMEL) 5 MCG tablet Take 0.5 tablets by mouth daily  Mariano Mistry DO   Cholecalciferol (VITAMIN D3) 125 MCG (5000 UT) CAPS as directed  Edin Morales MD   levothyroxine (SYNTHROID) 25 MCG tablet Take 1 tablet by mouth Daily  Mariano Mistry DO   benztropine (COGENTIN) 0.5 MG tablet   ProviderEdin MD   haloperidol (HALDOL) 5 MG tablet   Edin Morales MD   lurasidone (LATUDA) 40 MG TABS tablet   ProviderEdin MD   venlafaxine (EFFEXOR) 75 MG tablet   ProviderEdin MD CareTeam (Including outside providers/suppliers regularly involved in providing care):   Patient Care Team:  Mariano Mistry DO as PCP - General (Family Medicine)  Mariano Mistry DO as PCP - Empaneled Provider     Reviewed and updated this visit:  Tobacco  Allergies  Meds  Med

## 2024-03-18 NOTE — PATIENT INSTRUCTIONS
have bad effects. Long-term stress can make you more likely to get sick, and it can make symptoms of some diseases worse. If you tense up when you are stressed, you may develop neck, shoulder, or low back pain. Stress is linked to high blood pressure and heart disease.  Stress also harms your emotional health. It can make you ware, tense, or depressed. Your relationships may suffer, and you may not do well at work or school.  What can you do to manage stress?  You can try these things to help manage stress:   Do something active. Exercise or activity can help reduce stress. Walking is a great way to get started. Even everyday activities such as housecleaning or yard work can help.  Try yoga or donald chi. These techniques combine exercise and meditation. You may need some training at first to learn them.  Do something you enjoy. For example, listen to music or go to a movie. Practice your hobby or do volunteer work.  Meditate. This can help you relax, because you are not worrying about what happened before or what may happen in the future.  Do guided imagery. Imagine yourself in any setting that helps you feel calm. You can use online videos, books, or a teacher to guide you.  Do breathing exercises. For example:  From a standing position, bend forward from the waist with your knees slightly bent. Let your arms dangle close to the floor.  Breathe in slowly and deeply as you return to a standing position. Roll up slowly and lift your head last.  Hold your breath for just a few seconds in the standing position.  Breathe out slowly and bend forward from the waist.  Let your feelings out. Talk, laugh, cry, and express anger when you need to. Talking with supportive friends or family, a counselor, or a joni leader about your feelings is a healthy way to relieve stress. Avoid discussing your feelings with people who make you feel worse.  Write. It may help to write about things that are bothering you. This helps you find

## 2024-03-26 ENCOUNTER — OFFICE VISIT (OUTPATIENT)
Dept: FAMILY MEDICINE CLINIC | Age: 59
End: 2024-03-26
Payer: MEDICARE

## 2024-03-26 VITALS
HEIGHT: 66 IN | BODY MASS INDEX: 30.53 KG/M2 | TEMPERATURE: 98.1 F | SYSTOLIC BLOOD PRESSURE: 122 MMHG | DIASTOLIC BLOOD PRESSURE: 82 MMHG | WEIGHT: 190 LBS

## 2024-03-26 DIAGNOSIS — M54.50 ACUTE BILATERAL LOW BACK PAIN WITHOUT SCIATICA: Primary | ICD-10-CM

## 2024-03-26 PROCEDURE — 99213 OFFICE O/P EST LOW 20 MIN: CPT | Performed by: FAMILY MEDICINE

## 2024-03-26 PROCEDURE — 3079F DIAST BP 80-89 MM HG: CPT | Performed by: FAMILY MEDICINE

## 2024-03-26 PROCEDURE — 3074F SYST BP LT 130 MM HG: CPT | Performed by: FAMILY MEDICINE

## 2024-03-26 ASSESSMENT — ENCOUNTER SYMPTOMS: BACK PAIN: 1

## 2024-03-26 NOTE — PROGRESS NOTES
Subjective  Marsha Lozoya 1965 is a 58 y.o. female who presents today with:  Chief Complaint   Patient presents with    Back Pain     C/O lower back pain since Friday. States this feels different than the back pain she was previously having.  Denies radiation of pain. She denies pain when standing. She has pain when she is sitting, laying down or bending over. She has taken some Ibuprofen for relief.   She is leaving for [place] next week.        Back Pain      I have reviewed HPI and agree with above.     Review of Systems   Musculoskeletal:  Positive for back pain.   All other systems reviewed and are negative.      Past Medical History:   Diagnosis Date    Depression     Hypertension     Hypothyroidism      Past Surgical History:   Procedure Laterality Date    HYSTERECTOMY, VAGINAL  2005     Social History     Socioeconomic History    Marital status:      Spouse name: Not on file    Number of children: Not on file    Years of education: Not on file    Highest education level: Not on file   Occupational History    Not on file   Tobacco Use    Smoking status: Never    Smokeless tobacco: Never   Vaping Use    Vaping Use: Never used   Substance and Sexual Activity    Alcohol use: Not Currently     Comment: Not very often    Drug use: Not Currently     Comment: In late teens and twenties    Sexual activity: Not Currently     Comment: Not active - no partner at this time   Other Topics Concern    Not on file   Social History Narrative    Not on file     Social Determinants of Health     Financial Resource Strain: Low Risk  (9/17/2023)    Overall Financial Resource Strain (CARDIA)     Difficulty of Paying Living Expenses: Not very hard   Food Insecurity: Not on file (9/17/2023)   Transportation Needs: Unknown (9/17/2023)    PRAPARE - Transportation     Lack of Transportation (Medical): Not on file     Lack of Transportation (Non-Medical): No   Physical Activity: Insufficiently Active (3/15/2024)

## 2024-04-15 RX ORDER — LEVOTHYROXINE SODIUM 25 MCG
25 TABLET ORAL DAILY
Qty: 90 TABLET | Refills: 1 | Status: SHIPPED | OUTPATIENT
Start: 2024-04-15

## 2024-09-04 ENCOUNTER — OFFICE VISIT (OUTPATIENT)
Dept: FAMILY MEDICINE CLINIC | Age: 59
End: 2024-09-04
Payer: MEDICARE

## 2024-09-04 VITALS
HEART RATE: 64 BPM | WEIGHT: 192.3 LBS | SYSTOLIC BLOOD PRESSURE: 130 MMHG | DIASTOLIC BLOOD PRESSURE: 86 MMHG | HEIGHT: 66 IN | BODY MASS INDEX: 30.91 KG/M2 | OXYGEN SATURATION: 98 % | TEMPERATURE: 97.5 F

## 2024-09-04 DIAGNOSIS — Z12.31 SCREENING MAMMOGRAM FOR BREAST CANCER: ICD-10-CM

## 2024-09-04 DIAGNOSIS — R10.31 RIGHT GROIN PAIN: Primary | ICD-10-CM

## 2024-09-04 DIAGNOSIS — M54.50 ACUTE BILATERAL LOW BACK PAIN WITHOUT SCIATICA: ICD-10-CM

## 2024-09-04 PROCEDURE — 99213 OFFICE O/P EST LOW 20 MIN: CPT | Performed by: FAMILY MEDICINE

## 2024-09-04 PROCEDURE — 3075F SYST BP GE 130 - 139MM HG: CPT | Performed by: FAMILY MEDICINE

## 2024-09-04 PROCEDURE — 3079F DIAST BP 80-89 MM HG: CPT | Performed by: FAMILY MEDICINE

## 2024-09-04 ASSESSMENT — ENCOUNTER SYMPTOMS: BACK PAIN: 1

## 2024-09-04 NOTE — PROGRESS NOTES
Subjective  Marsha Lozoya 1965 is a 59 y.o. female who presents today with:  Chief Complaint   Patient presents with    Hypertension     She does not monitor her BP at home. Denies chest pains, palpitations, SOB, dizziness, lightheadedness, headaches or edema.       Diabetes     A1C was 6.2 on 03/11/24. Diet controlled.    Thyroid Problem     Hx hypothyroidism. She is currently taking Cytomel 5 mcg and Synthroid 25 mcg daily.       Depression     Moods stable with current medication. She follows with Steffany Pelayo NP for  concerns.      Back Pain     C/O right SI pain that radiates into her groin for the past 2-3 weeks. Denies injury. The pain is worse when she is standing for long periods of time. Denies incontinence. Denies numbness or tingling in her legs or feet.    Health Maintenance     Yearly labs drawn last on 3/11/24.  Mammogram and DEXA completed at [facility] on 9/6/23.   Colonoscopy completed last in April 2022 at [facility].  AWV completed last on 06/19/23.       Hypertension  Identifiable causes of hypertension include a thyroid problem.   Diabetes    Thyroid Problem    Depression  Back Pain      I have reviewed HPI and agree with above.     Review of Systems   Musculoskeletal:  Positive for back pain.   Psychiatric/Behavioral:  Positive for depression.    All other systems reviewed and are negative.      Past Medical History:   Diagnosis Date    Depression     Hypertension     Hypothyroidism      Past Surgical History:   Procedure Laterality Date    HYSTERECTOMY, VAGINAL  2005     Social History     Socioeconomic History    Marital status:      Spouse name: Not on file    Number of children: Not on file    Years of education: Not on file    Highest education level: Not on file   Occupational History    Not on file   Tobacco Use    Smoking status: Never    Smokeless tobacco: Never   Vaping Use    Vaping status: Never Used   Substance and Sexual Activity    Alcohol use: Not Currently      Detail Level: Zone

## 2024-09-11 ENCOUNTER — TELEPHONE (OUTPATIENT)
Dept: FAMILY MEDICINE CLINIC | Age: 59
End: 2024-09-11

## 2024-10-07 RX ORDER — LIOTHYRONINE SODIUM 5 MCG
TABLET ORAL
Qty: 45 TABLET | Refills: 3 | Status: SHIPPED | OUTPATIENT
Start: 2024-10-07

## 2024-10-07 NOTE — TELEPHONE ENCOUNTER
Future Appointments    Encounter Information   Provider Department Appt Notes   3/14/2025 Mariano Mistry,  [facility] Annual f/u     Past Visits    Date Provider Specialty Visit Type Primary Dx   09/04/2024 Mariano Mistry, DO Family Medicine Office Visit Right groin

## 2024-10-22 RX ORDER — METOPROLOL SUCCINATE 25 MG
25 TABLET, EXTENDED RELEASE 24 HR ORAL DAILY
Qty: 90 TABLET | Refills: 1 | Status: SHIPPED | OUTPATIENT
Start: 2024-10-22

## 2024-10-22 NOTE — TELEPHONE ENCOUNTER
Pharmacy requesting medication refill. Please approve or deny this request.    Rx requested:  Requested Prescriptions     Pending Prescriptions Disp Refills    TOPROL XL 25 MG extended release tablet [Pharmacy Med Name: TOPROL XL    TAB 25MG] 90 tablet 1     Sig: TAKE 1 TABLET BY MOUTH ONCE A DAY         Last Office Visit:   9/4/2024      Next Visit Date:  Future Appointments   Date Time Provider Department Center   3/14/2025 11:30 AM Mariano Mistry,  [facility] DE[facility]

## 2024-10-30 ENCOUNTER — NURSE ONLY (OUTPATIENT)
Dept: FAMILY MEDICINE CLINIC | Age: 59
End: 2024-10-30
Payer: MEDICARE

## 2024-10-30 DIAGNOSIS — Z23 NEED FOR INFLUENZA VACCINATION: Primary | ICD-10-CM

## 2024-10-30 PROCEDURE — 90661 CCIIV3 VAC ABX FR 0.5 ML IM: CPT | Performed by: FAMILY MEDICINE

## 2024-10-30 PROCEDURE — G0008 ADMIN INFLUENZA VIRUS VAC: HCPCS | Performed by: FAMILY MEDICINE

## 2024-11-07 RX ORDER — ARIPIPRAZOLE 20 MG/1
20 TABLET ORAL 2 TIMES DAILY
Qty: 180 TABLET | Refills: 3 | Status: SHIPPED | OUTPATIENT
Start: 2024-11-07

## 2024-11-07 NOTE — TELEPHONE ENCOUNTER
Future Appointments    Encounter Information   Provider Department Appt Notes   3/14/2025 Mariano Mistry DO [facility] Annual f/u     Past Visits    Date Provider Specialty Visit Type Primary Dx   10/30/2024  Family Medicine Nurse Only Need for influenza vaccination   09/04/2024 Mariano Mistry DO Family Medicine Office Visit Right groin pain

## 2024-12-05 RX ORDER — LEVOTHYROXINE SODIUM 25 MCG
25 TABLET ORAL DAILY
Qty: 90 TABLET | Refills: 3 | Status: SHIPPED | OUTPATIENT
Start: 2024-12-05

## 2025-01-07 ENCOUNTER — PATIENT MESSAGE (OUTPATIENT)
Age: 60
End: 2025-01-07

## 2025-03-07 ENCOUNTER — TELEMEDICINE (OUTPATIENT)
Age: 60
End: 2025-03-07
Payer: MEDICARE

## 2025-03-07 ENCOUNTER — TELEPHONE (OUTPATIENT)
Age: 60
End: 2025-03-07

## 2025-03-07 DIAGNOSIS — I10 PRIMARY HYPERTENSION: ICD-10-CM

## 2025-03-07 DIAGNOSIS — E07.9 DISORDER OF THYROID: ICD-10-CM

## 2025-03-07 DIAGNOSIS — E11.69 TYPE 2 DIABETES MELLITUS WITH HYPERLIPIDEMIA (HCC): Primary | ICD-10-CM

## 2025-03-07 DIAGNOSIS — E78.2 MIXED HYPERLIPIDEMIA: ICD-10-CM

## 2025-03-07 DIAGNOSIS — E03.8 OTHER SPECIFIED HYPOTHYROIDISM: ICD-10-CM

## 2025-03-07 DIAGNOSIS — Z00.00 MEDICARE ANNUAL WELLNESS VISIT, SUBSEQUENT: Primary | ICD-10-CM

## 2025-03-07 DIAGNOSIS — E78.5 TYPE 2 DIABETES MELLITUS WITH HYPERLIPIDEMIA (HCC): Primary | ICD-10-CM

## 2025-03-07 PROCEDURE — G0439 PPPS, SUBSEQ VISIT: HCPCS | Performed by: NURSE PRACTITIONER

## 2025-03-07 SDOH — ECONOMIC STABILITY: FOOD INSECURITY: WITHIN THE PAST 12 MONTHS, YOU WORRIED THAT YOUR FOOD WOULD RUN OUT BEFORE YOU GOT MONEY TO BUY MORE.: NEVER TRUE

## 2025-03-07 SDOH — ECONOMIC STABILITY: FOOD INSECURITY: WITHIN THE PAST 12 MONTHS, THE FOOD YOU BOUGHT JUST DIDN'T LAST AND YOU DIDN'T HAVE MONEY TO GET MORE.: NEVER TRUE

## 2025-03-07 ASSESSMENT — PATIENT HEALTH QUESTIONNAIRE - PHQ9
SUM OF ALL RESPONSES TO PHQ QUESTIONS 1-9: 4
4. FEELING TIRED OR HAVING LITTLE ENERGY: SEVERAL DAYS
SUM OF ALL RESPONSES TO PHQ QUESTIONS 1-9: 4
9. THOUGHTS THAT YOU WOULD BE BETTER OFF DEAD, OR OF HURTING YOURSELF: NOT AT ALL
6. FEELING BAD ABOUT YOURSELF - OR THAT YOU ARE A FAILURE OR HAVE LET YOURSELF OR YOUR FAMILY DOWN: NOT AT ALL
SUM OF ALL RESPONSES TO PHQ QUESTIONS 1-9: 4
1. LITTLE INTEREST OR PLEASURE IN DOING THINGS: SEVERAL DAYS
3. TROUBLE FALLING OR STAYING ASLEEP: SEVERAL DAYS
SUM OF ALL RESPONSES TO PHQ QUESTIONS 1-9: 4
7. TROUBLE CONCENTRATING ON THINGS, SUCH AS READING THE NEWSPAPER OR WATCHING TELEVISION: NOT AT ALL
5. POOR APPETITE OR OVEREATING: NOT AT ALL
8. MOVING OR SPEAKING SO SLOWLY THAT OTHER PEOPLE COULD HAVE NOTICED. OR THE OPPOSITE, BEING SO FIGETY OR RESTLESS THAT YOU HAVE BEEN MOVING AROUND A LOT MORE THAN USUAL: NOT AT ALL
2. FEELING DOWN, DEPRESSED OR HOPELESS: SEVERAL DAYS
10. IF YOU CHECKED OFF ANY PROBLEMS, HOW DIFFICULT HAVE THESE PROBLEMS MADE IT FOR YOU TO DO YOUR WORK, TAKE CARE OF THINGS AT HOME, OR GET ALONG WITH OTHER PEOPLE: SOMEWHAT DIFFICULT

## 2025-03-07 ASSESSMENT — LIFESTYLE VARIABLES
HOW MANY STANDARD DRINKS CONTAINING ALCOHOL DO YOU HAVE ON A TYPICAL DAY: 1 OR 2
HOW OFTEN DO YOU HAVE A DRINK CONTAINING ALCOHOL: MONTHLY OR LESS

## 2025-03-07 NOTE — PROGRESS NOTES
Medicare Annual Wellness Visit    Marsha Lozoya is here for Medicare AWV    Assessment & Plan   Medicare annual wellness visit, subsequent       Return in 1 year (on 3/7/2026) for Medicare AWV.     Subjective       Patient's complete Health Risk Assessment and screening values have been reviewed and are found in Flowsheets. The following problems were reviewed today and where indicated follow up appointments were made and/or referrals ordered.    Positive Risk Factor Screenings with Interventions:             General HRA Questions:  Select all that apply: (!) New or Increased Pain, New or Increased Fatigue  Interventions - Pain:  Patient advised to follow up in the office for further evaluation and treatment  Interventions Fatigue:  Patient advised to follow up in the office for further evaluation and treatment      Inactivity:  On average, how many days per week do you engage in moderate to strenuous exercise (like a brisk walk)?: 1 day (!) Abnormal  On average, how many minutes do you engage in exercise at this level?: 30 min  Interventions:  Patient declined any further interventions or treatment     Abnormal BMI (obese):  There is no height or weight on file to calculate BMI. (!) Abnormal  Interventions:  Patient declines any further evaluation or treatment                           Objective    Patient-Reported Vitals  No data recorded               Allergies   Allergen Reactions    Ciprofloxacin Hallucinations     Other reaction(s): Mood alteration, Unknown    Amoxicillin-Pot Clavulanate      (Augmentin Allergy) Reaction: mouth ulceration    Codeine      Other reaction(s): Mood alteration     Prior to Visit Medications    Medication Sig Taking? Authorizing Provider   SYNTHROID 25 MCG tablet TAKE 1 TABLET BY MOUTH ONCE A DAY Yes Mariano Mistry, DO   ABILIFY 20 MG tablet TAKE 1 TABLET BY MOUTH IN THE MORNING AND AT BEDTIME Yes Mariano Mistry, DO   TOPROL XL 25 MG extended release tablet TAKE 1 TABLET BY

## 2025-03-07 NOTE — TELEPHONE ENCOUNTER
Patient has an appt on 3/14. She would like to have blood work done prior to the appt so she is inquiring if orders can be faxed to [facility] in [place].

## 2025-03-11 LAB
ALBUMIN: 4.7 G/DL
ALP BLD-CCNC: 61 U/L
ALT SERPL-CCNC: 13 U/L
ANION GAP SERPL CALCULATED.3IONS-SCNC: 16 MMOL/L
AST SERPL-CCNC: 21 U/L
BASOPHILS ABSOLUTE: 0 /ΜL
BASOPHILS RELATIVE PERCENT: 0.6 %
BILIRUB SERPL-MCNC: 0.4 MG/DL (ref 0.1–1.4)
BUN BLDV-MCNC: 17 MG/DL
CALCIUM SERPL-MCNC: 9.6 MG/DL
CHLORIDE BLD-SCNC: 105 MMOL/L
CO2: 22 MMOL/L
CREAT SERPL-MCNC: 1 MG/DL
EOSINOPHILS ABSOLUTE: 0.1 /ΜL
EOSINOPHILS RELATIVE PERCENT: 1.9 %
GFR, ESTIMATED: 64
GLUCOSE BLD-MCNC: 89 MG/DL
HCT VFR BLD CALC: 38.1 % (ref 36–46)
HEMOGLOBIN: 13 G/DL (ref 12–16)
LYMPHOCYTES ABSOLUTE: 2.6 /ΜL
LYMPHOCYTES RELATIVE PERCENT: 40.7 %
MCH RBC QN AUTO: 28.8 PG
MCHC RBC AUTO-ENTMCNC: 34 G/DL
MCV RBC AUTO: 84.6 FL
MONOCYTES ABSOLUTE: 0.3 /ΜL
MONOCYTES RELATIVE PERCENT: 4.8 %
NEUTROPHILS ABSOLUTE: 3.3 /ΜL
NEUTROPHILS RELATIVE PERCENT: 52 %
PLATELET # BLD: 260 K/ΜL
PMV BLD AUTO: 8.3 FL
POTASSIUM SERPL-SCNC: 4.2 MMOL/L
RBC # BLD: 4.5 10^6/ΜL
SODIUM BLD-SCNC: 139 MMOL/L
T3 TOTAL: 2.6
TOTAL PROTEIN: 7.3 G/DL (ref 6.4–8.2)
TSH SERPL DL<=0.05 MIU/L-ACNC: 1.27 UIU/ML
WBC # BLD: 6.4 10^3/ML

## 2025-03-13 SDOH — ECONOMIC STABILITY: FOOD INSECURITY: WITHIN THE PAST 12 MONTHS, YOU WORRIED THAT YOUR FOOD WOULD RUN OUT BEFORE YOU GOT MONEY TO BUY MORE.: NEVER TRUE

## 2025-03-13 SDOH — ECONOMIC STABILITY: INCOME INSECURITY: IN THE LAST 12 MONTHS, WAS THERE A TIME WHEN YOU WERE NOT ABLE TO PAY THE MORTGAGE OR RENT ON TIME?: NO

## 2025-03-13 SDOH — ECONOMIC STABILITY: FOOD INSECURITY: WITHIN THE PAST 12 MONTHS, THE FOOD YOU BOUGHT JUST DIDN'T LAST AND YOU DIDN'T HAVE MONEY TO GET MORE.: NEVER TRUE

## 2025-03-13 SDOH — ECONOMIC STABILITY: TRANSPORTATION INSECURITY
IN THE PAST 12 MONTHS, HAS THE LACK OF TRANSPORTATION KEPT YOU FROM MEDICAL APPOINTMENTS OR FROM GETTING MEDICATIONS?: NO

## 2025-03-14 ENCOUNTER — OFFICE VISIT (OUTPATIENT)
Age: 60
End: 2025-03-14
Payer: MEDICARE

## 2025-03-14 VITALS
WEIGHT: 193.4 LBS | HEART RATE: 86 BPM | SYSTOLIC BLOOD PRESSURE: 126 MMHG | TEMPERATURE: 99 F | BODY MASS INDEX: 31.08 KG/M2 | HEIGHT: 66 IN | OXYGEN SATURATION: 98 % | DIASTOLIC BLOOD PRESSURE: 82 MMHG

## 2025-03-14 DIAGNOSIS — E78.5 TYPE 2 DIABETES MELLITUS WITH HYPERLIPIDEMIA (HCC): ICD-10-CM

## 2025-03-14 DIAGNOSIS — E11.49 TYPE 2 DIABETES MELLITUS WITH NEUROLOGICAL MANIFESTATIONS, CONTROLLED (HCC): Primary | ICD-10-CM

## 2025-03-14 DIAGNOSIS — I10 PRIMARY HYPERTENSION: ICD-10-CM

## 2025-03-14 DIAGNOSIS — M54.50 ACUTE BILATERAL LOW BACK PAIN WITHOUT SCIATICA: ICD-10-CM

## 2025-03-14 DIAGNOSIS — E78.2 MIXED HYPERLIPIDEMIA: ICD-10-CM

## 2025-03-14 DIAGNOSIS — E11.69 TYPE 2 DIABETES MELLITUS WITH HYPERLIPIDEMIA (HCC): ICD-10-CM

## 2025-03-14 DIAGNOSIS — E03.8 OTHER SPECIFIED HYPOTHYROIDISM: ICD-10-CM

## 2025-03-14 DIAGNOSIS — G56.02 CARPAL TUNNEL SYNDROME OF LEFT WRIST: ICD-10-CM

## 2025-03-14 DIAGNOSIS — F33.42: ICD-10-CM

## 2025-03-14 DIAGNOSIS — K59.09 CHRONIC CONSTIPATION: ICD-10-CM

## 2025-03-14 PROCEDURE — 3074F SYST BP LT 130 MM HG: CPT | Performed by: FAMILY MEDICINE

## 2025-03-14 PROCEDURE — 3079F DIAST BP 80-89 MM HG: CPT | Performed by: FAMILY MEDICINE

## 2025-03-14 PROCEDURE — 99214 OFFICE O/P EST MOD 30 MIN: CPT | Performed by: FAMILY MEDICINE

## 2025-03-14 RX ORDER — SENNOSIDES A AND B 8.6 MG/1
1 TABLET, FILM COATED ORAL DAILY
Qty: 90 TABLET | Refills: 3 | Status: SHIPPED | OUTPATIENT
Start: 2025-03-14 | End: 2026-03-14

## 2025-03-14 RX ORDER — LURASIDONE HYDROCHLORIDE 60 MG/1
60 TABLET, FILM COATED ORAL
COMMUNITY
Start: 2025-01-15

## 2025-03-14 NOTE — PROGRESS NOTES
Marsha Lozoya (:  1965) is a 59 y.o. female, Established patient, here for evaluation of the following chief complaint(s):  Annual Exam, Discuss Labs (Patient had labs completed at [facility] on 3.11.25), Other (Patient c/o burning sensation on both breasts for approx 2 months. ), Back Pain (Patient c/o middle back pain for approx 1 year overall, sharp pain for approx 2 weeks. Patient expresses concerns of arthritis. Patient has not been taking medication for pain. Patient states pain level is 2-3/10, mostly presents at night. Patient states she has a hard time standing for extended periods of time. ), Carpal Tunnel (Patient c/o bilateral carpal tunnel. Patient was given Rx for wrist splint which doesn't seem to help. Patient states pain will wake her up overnight. Patient states she's not fully interested/ready for surgery. Patient states she does a lot of writing and is right handed, where most of pain presents. ), and Constipation (Patient c/o constipation. Patient states she's been taking Senocot OTC. Patient states Miralax causes loose bowels so she does not take. )          Subjective   History of Present Illness  The patient presents for evaluation of depression, carpal tunnel syndrome, constipation, back pain, thyroid issues, and breast discomfort.    She reports suboptimal blood glucose levels and is awaiting the results of her recent blood work conducted on Tuesday, 2025.    She is uncertain about her thyroid status but notes significant hair loss and changes in hair texture, which she attributes to aging.    Her depression has been more challenging than usual, with an increase in her Latuda dosage in 2024. She experienced emotional triggers following the birth of her grandchild, Al, and finds it difficult to cry. She feels emotionally numb due to the medication and fears losing control if she becomes too emotional. These episodes typically occur at home, not at work. A few years

## 2025-03-19 DIAGNOSIS — K59.09 CHRONIC CONSTIPATION: ICD-10-CM

## 2025-03-19 RX ORDER — SENNOSIDES A AND B 8.6 MG/1
1 TABLET, FILM COATED ORAL DAILY
Qty: 30 TABLET | Refills: 2 | Status: SHIPPED | OUTPATIENT
Start: 2025-03-19 | End: 2025-06-17

## 2025-03-19 NOTE — TELEPHONE ENCOUNTER
Patient is inquiring if her script for Senokot 8.6 mg that was sent to [facility] delivery pharmacy can be resent to [facility] in [place] instead.    She would like it sent as a 30 day supply with refills.

## 2025-04-14 LAB — TSH SERPL DL<=0.05 MIU/L-ACNC: 0.58 UIU/ML

## 2025-04-15 LAB — T3 FREE: 3.3

## 2025-04-22 ENCOUNTER — TELEPHONE (OUTPATIENT)
Age: 60
End: 2025-04-22

## 2025-04-22 NOTE — TELEPHONE ENCOUNTER
Patient called about the medication she is taking for constipation. She wants to know if she can take the Senna and miralax at the same time.     She is leaving for [place] on Saturday and does not want to deal with it on vacation. She is open to other options as well.

## 2025-05-14 ENCOUNTER — TELEMEDICINE (OUTPATIENT)
Age: 60
End: 2025-05-14
Payer: MEDICARE

## 2025-05-14 DIAGNOSIS — E03.8 OTHER SPECIFIED HYPOTHYROIDISM: ICD-10-CM

## 2025-05-14 DIAGNOSIS — M75.42 ROTATOR CUFF IMPINGEMENT SYNDROME OF LEFT SHOULDER: Primary | ICD-10-CM

## 2025-05-14 PROCEDURE — 99213 OFFICE O/P EST LOW 20 MIN: CPT | Performed by: FAMILY MEDICINE

## 2025-05-14 RX ORDER — METHYLPREDNISOLONE 4 MG/1
TABLET ORAL
Qty: 1 KIT | Refills: 0 | Status: SHIPPED | OUTPATIENT
Start: 2025-05-14

## 2025-05-14 NOTE — PROGRESS NOTES
Marsha Lozoya, was evaluated through a synchronous (real-time) audio-video encounter. The patient (or guardian if applicable) is aware that this is a billable service, which includes applicable co-pays. This Virtual Visit was conducted with patient's (and/or legal guardian's) consent. Patient identification was verified, and a caregiver was present when appropriate.   The patient was located at Home: [address] [place] [id number]  Provider was located at Facility (Appt Dept): [address] [place], [place] [id number]  Confirm you are appropriately licensed, registered, or certified to deliver care in the state where the patient is located as indicated above. If you are not or unsure, please re-schedule the visit: Yes, I confirm.     Marsha Lozoya (:  1965) is a Established patient, presenting virtually for evaluation of the following:      Below is the assessment and plan developed based on review of pertinent history, physical exam, labs, studies, and medications.     Assessment & Plan  1. Left shoulder pain.  - Persistent pain in the left shoulder, worsening recently.  - Previous cortisone shot last year provided relief until now.  - MRI of the left shoulder will be scheduled to evaluate the shoulder joint, rotator cuff tendon, and labrum.  - Steroid dose pack prescribed to reduce inflammation and discomfort, with instructions to monitor blood sugar levels during the initial 3 days of treatment.    2. Hypothyroidism.  - Thyroid screening blood work from 2025 showed improvement.  - Advised to continue taking the double dose of levothyroxine as previously prescribed.  - Will notify when a new prescription is needed.    3. Carpal tunnel syndrome.  - Reports that carpal tunnel symptoms have not been as severe recently.  - Has not been using splints as they tend to worsen symptoms.  - No further treatment changes at this time.      Assessment & Plan  Rotator cuff impingement syndrome of left shoulder

## 2025-05-15 ENCOUNTER — TELEPHONE (OUTPATIENT)
Age: 60
End: 2025-05-15

## 2025-05-22 ENCOUNTER — TELEPHONE (OUTPATIENT)
Age: 60
End: 2025-05-22

## 2025-05-22 NOTE — TELEPHONE ENCOUNTER
Insurance denied MRI.   Will approve if Xray supports need or patient has tried and failed six weeks of physical therapy.

## 2025-05-29 RX ORDER — LEVOTHYROXINE SODIUM 50 UG/1
50 TABLET ORAL DAILY
Qty: 90 TABLET | Refills: 0 | Status: SHIPPED | OUTPATIENT
Start: 2025-05-29

## 2025-06-16 RX ORDER — METOPROLOL SUCCINATE 25 MG
25 TABLET, EXTENDED RELEASE 24 HR ORAL DAILY
Qty: 90 TABLET | Refills: 1 | Status: SHIPPED | OUTPATIENT
Start: 2025-06-16

## 2025-06-23 ENCOUNTER — TELEPHONE (OUTPATIENT)
Age: 60
End: 2025-06-23

## 2025-06-23 NOTE — TELEPHONE ENCOUNTER
Pt called in to request an appt asap she is having back issues and she is leaving Friday to go out of town. She is requesting to be squeezed in if possible. Please advise

## 2025-06-25 ENCOUNTER — OFFICE VISIT (OUTPATIENT)
Age: 60
End: 2025-06-25
Payer: MEDICARE

## 2025-06-25 VITALS
WEIGHT: 191 LBS | DIASTOLIC BLOOD PRESSURE: 78 MMHG | SYSTOLIC BLOOD PRESSURE: 130 MMHG | TEMPERATURE: 97.4 F | HEIGHT: 66 IN | BODY MASS INDEX: 30.7 KG/M2

## 2025-06-25 DIAGNOSIS — M99.04 SOMATIC DYSFUNCTION OF BOTH SACROILIAC JOINTS: Primary | ICD-10-CM

## 2025-06-25 DIAGNOSIS — M26.621 ARTHRALGIA OF RIGHT TEMPOROMANDIBULAR JOINT: ICD-10-CM

## 2025-06-25 DIAGNOSIS — G89.29 CHRONIC BILATERAL LOW BACK PAIN WITHOUT SCIATICA: ICD-10-CM

## 2025-06-25 DIAGNOSIS — F33.42: ICD-10-CM

## 2025-06-25 DIAGNOSIS — M54.50 CHRONIC BILATERAL LOW BACK PAIN WITHOUT SCIATICA: ICD-10-CM

## 2025-06-25 PROCEDURE — 3075F SYST BP GE 130 - 139MM HG: CPT | Performed by: FAMILY MEDICINE

## 2025-06-25 PROCEDURE — 3078F DIAST BP <80 MM HG: CPT | Performed by: FAMILY MEDICINE

## 2025-06-25 PROCEDURE — 99214 OFFICE O/P EST MOD 30 MIN: CPT | Performed by: FAMILY MEDICINE

## 2025-06-25 NOTE — PROGRESS NOTES
Dispense sufficient amount for indicated testing frequency plus additional to accommodate PRN testing needs. 3/15/24   Mariano Mistry,    Cholecalciferol (VITAMIN D3) 125 MCG (5000 UT) CAPS as directed    ProviderEdin MD   benztropine (COGENTIN) 0.5 MG tablet  8/21/23   Edin Morales MD   haloperidol (HALDOL) 5 MG tablet  8/3/23   Edin Morales MD   venlafaxine (EFFEXOR) 75 MG tablet  8/3/23   ProviderEdin MD              Objective   Blood pressure 130/78, temperature 97.4 °F (36.3 °C), temperature source Temporal, height 1.676 m (5' 6\"), weight 86.6 kg (191 lb).  Physical Exam  Vitals reviewed.   Constitutional:       Appearance: Normal appearance. She is normal weight.   HENT:      Head: Normocephalic and atraumatic.   Cardiovascular:      Pulses: Normal pulses.      Heart sounds: Normal heart sounds.   Pulmonary:      Effort: Pulmonary effort is normal.      Breath sounds: Normal breath sounds.   Musculoskeletal:         General: Tenderness present.      Cervical back: Normal range of motion and neck supple.   Skin:     General: Skin is warm and dry.   Neurological:      General: No focal deficit present.      Mental Status: She is alert.   Psychiatric:         Mood and Affect: Mood normal.         Behavior: Behavior normal.         Thought Content: Thought content normal.         Judgment: Judgment normal.          Results         Assessment & Plan  1. Back pain.  - The patient's back pain is likely due to hyperextension of the back, which can cause discomfort.  - The sacrum appears to be settling towards the table, indicating a need for adjustment. Both sacroiliac joints on the right side were found to be misaligned, but have since been corrected. Additionally, L5 over the sacrum was also rotated and has been adjusted.  - Advised to avoid bending over while gardening and to use proper bending techniques to prevent further strain on her back.  - This should provide more

## 2025-08-25 RX ORDER — LEVOTHYROXINE SODIUM 50 MCG
50 TABLET ORAL DAILY
Qty: 90 TABLET | Refills: 0 | Status: SHIPPED | OUTPATIENT
Start: 2025-08-25

## 2025-09-02 ENCOUNTER — TELEPHONE (OUTPATIENT)
Age: 60
End: 2025-09-02

## 2025-09-02 DIAGNOSIS — E11.69 TYPE 2 DIABETES MELLITUS WITH HYPERLIPIDEMIA (HCC): ICD-10-CM

## 2025-09-02 DIAGNOSIS — I10 PRIMARY HYPERTENSION: Primary | ICD-10-CM

## 2025-09-02 DIAGNOSIS — E78.5 TYPE 2 DIABETES MELLITUS WITH HYPERLIPIDEMIA (HCC): ICD-10-CM

## 2025-09-02 DIAGNOSIS — E07.9 DISORDER OF THYROID: ICD-10-CM

## 2025-09-02 DIAGNOSIS — Z12.31 SCREENING MAMMOGRAM FOR BREAST CANCER: ICD-10-CM
